# Patient Record
Sex: MALE | Race: WHITE | NOT HISPANIC OR LATINO | Employment: OTHER | URBAN - METROPOLITAN AREA
[De-identification: names, ages, dates, MRNs, and addresses within clinical notes are randomized per-mention and may not be internally consistent; named-entity substitution may affect disease eponyms.]

---

## 2021-05-25 ENCOUNTER — RECORDS - HEALTHEAST (OUTPATIENT)
Dept: ADMINISTRATIVE | Facility: CLINIC | Age: 26
End: 2021-05-25

## 2021-05-29 ENCOUNTER — RECORDS - HEALTHEAST (OUTPATIENT)
Dept: ADMINISTRATIVE | Facility: CLINIC | Age: 26
End: 2021-05-29

## 2021-05-30 ENCOUNTER — RECORDS - HEALTHEAST (OUTPATIENT)
Dept: ADMINISTRATIVE | Facility: CLINIC | Age: 26
End: 2021-05-30

## 2021-06-02 ENCOUNTER — RECORDS - HEALTHEAST (OUTPATIENT)
Dept: ADMINISTRATIVE | Facility: CLINIC | Age: 26
End: 2021-06-02

## 2022-03-30 ENCOUNTER — HOSPITAL ENCOUNTER (EMERGENCY)
Facility: CLINIC | Age: 27
Discharge: HOME | End: 2022-03-30
Attending: EMERGENCY MEDICINE | Admitting: EMERGENCY MEDICINE

## 2022-03-30 ENCOUNTER — APPOINTMENT (OUTPATIENT)
Dept: ULTRASOUND IMAGING | Facility: CLINIC | Age: 27
End: 2022-03-30
Attending: EMERGENCY MEDICINE

## 2022-03-30 VITALS
WEIGHT: 280 LBS | HEART RATE: 70 BPM | OXYGEN SATURATION: 96 % | DIASTOLIC BLOOD PRESSURE: 65 MMHG | TEMPERATURE: 97.9 F | RESPIRATION RATE: 20 BRPM | SYSTOLIC BLOOD PRESSURE: 110 MMHG

## 2022-03-30 DIAGNOSIS — Z90.79 HISTORY OF UNILATERAL ORCHIECTOMY: ICD-10-CM

## 2022-03-30 DIAGNOSIS — S30.22XA SCROTAL HEMATOMA: ICD-10-CM

## 2022-03-30 LAB
ANION GAP SERPL CALCULATED.3IONS-SCNC: 12 MMOL/L (ref 5–18)
BASOPHILS # BLD AUTO: 0.1 10E3/UL (ref 0–0.2)
BASOPHILS NFR BLD AUTO: 1 %
BUN SERPL-MCNC: 10 MG/DL (ref 8–22)
CALCIUM SERPL-MCNC: 8.6 MG/DL (ref 8.5–10.5)
CHLORIDE BLD-SCNC: 110 MMOL/L (ref 98–107)
CO2 SERPL-SCNC: 22 MMOL/L (ref 22–31)
CREAT SERPL-MCNC: 0.82 MG/DL (ref 0.7–1.3)
EOSINOPHIL # BLD AUTO: 0.3 10E3/UL (ref 0–0.7)
EOSINOPHIL NFR BLD AUTO: 4 %
ERYTHROCYTE [DISTWIDTH] IN BLOOD BY AUTOMATED COUNT: 13.6 % (ref 10–15)
GFR SERPL CREATININE-BSD FRML MDRD: >90 ML/MIN/1.73M2
GLUCOSE BLD-MCNC: 102 MG/DL (ref 70–125)
HCT VFR BLD AUTO: 44.6 % (ref 40–53)
HGB BLD-MCNC: 15.2 G/DL (ref 13.3–17.7)
IMM GRANULOCYTES # BLD: 0 10E3/UL
IMM GRANULOCYTES NFR BLD: 0 %
LACTATE SERPL-SCNC: 0.7 MMOL/L (ref 0.7–2)
LYMPHOCYTES # BLD AUTO: 2 10E3/UL (ref 0.8–5.3)
LYMPHOCYTES NFR BLD AUTO: 29 %
MCH RBC QN AUTO: 28.4 PG (ref 26.5–33)
MCHC RBC AUTO-ENTMCNC: 34.1 G/DL (ref 31.5–36.5)
MCV RBC AUTO: 83 FL (ref 78–100)
MONOCYTES # BLD AUTO: 0.7 10E3/UL (ref 0–1.3)
MONOCYTES NFR BLD AUTO: 10 %
NEUTROPHILS # BLD AUTO: 3.9 10E3/UL (ref 1.6–8.3)
NEUTROPHILS NFR BLD AUTO: 56 %
NRBC # BLD AUTO: 0 10E3/UL
NRBC BLD AUTO-RTO: 0 /100
PLATELET # BLD AUTO: 250 10E3/UL (ref 150–450)
POTASSIUM BLD-SCNC: 4.1 MMOL/L (ref 3.5–5)
RBC # BLD AUTO: 5.35 10E6/UL (ref 4.4–5.9)
SODIUM SERPL-SCNC: 144 MMOL/L (ref 136–145)
WBC # BLD AUTO: 7 10E3/UL (ref 4–11)

## 2022-03-30 PROCEDURE — 80048 BASIC METABOLIC PNL TOTAL CA: CPT | Performed by: EMERGENCY MEDICINE

## 2022-03-30 PROCEDURE — 93976 VASCULAR STUDY: CPT

## 2022-03-30 PROCEDURE — 99284 EMERGENCY DEPT VISIT MOD MDM: CPT | Mod: 25

## 2022-03-30 PROCEDURE — 99285 EMERGENCY DEPT VISIT HI MDM: CPT | Mod: 25

## 2022-03-30 PROCEDURE — 83605 ASSAY OF LACTIC ACID: CPT | Performed by: EMERGENCY MEDICINE

## 2022-03-30 PROCEDURE — 36415 COLL VENOUS BLD VENIPUNCTURE: CPT | Performed by: EMERGENCY MEDICINE

## 2022-03-30 PROCEDURE — 85025 COMPLETE CBC W/AUTO DIFF WBC: CPT | Performed by: EMERGENCY MEDICINE

## 2022-03-30 ASSESSMENT — ENCOUNTER SYMPTOMS
HEADACHES: 0
RHINORRHEA: 0
ABDOMINAL PAIN: 0
VOMITING: 0
SORE THROAT: 0
DIARRHEA: 0
DIFFICULTY URINATING: 0

## 2022-03-30 NOTE — ED TRIAGE NOTES
Pt reports he had left testicle removed on February 17th in [place] at [place], [place].  Pt reports abnormal swelling to that region that started this weekend.  Pt has been icing and took tylenol and ibuprofen prior to arrival.  Denies any problems voiding.

## 2022-03-30 NOTE — DISCHARGE INSTRUCTIONS
Read and follow the discharge instructions.    You have a large clot in the scrotum.  Dr. Dominguez is planning to see you next week    Return to the [facility] emergency department if you have increased swelling, redness, fever, or any other concerns.

## 2022-03-30 NOTE — ED PROVIDER NOTES
EMERGENCY DEPARTMENT ENCOUNTER      NAME: Israel Mcguire  AGE: 26 year old male  YOB: 1995  MRN: [id number]  EVALUATION DATE & TIME: 3/30/2022 10:47 AM    PCP: [facility] Donna Yost    ED PROVIDER: Jacki Payne M.D.      CHIEF COMPLAINT     Chief Complaint   Patient presents with     Post-op Problem         FINAL IMPRESSION:     1. Scrotal hematoma    2. History of unilateral orchiectomy          MEDICAL DECISION MAKING:       Pertinent Labs & Imaging studies reviewed. (See chart for details)    26 year old male presents to the Emergency Department for evaluation of left scrotal swelling    ED Course as of 03/30/22 1303   Wed Mar 30, 2022   1107 Mr. Mcguire 26-year-old male who states in February he had the left testicle removed because of cancer.  He reports that he did not have to undergo any chemotherapy.  7 days ago he noticed some swelling was seen at the clinic had an ultrasound and was told to come back if he becomes more swollen.   1107 He noticed that he has become more swollen with some pain but denies any color change.  She denies any other systemic symptoms.   1107 On examination obese cooperative and pleasant in no distress.   exam with a chaperone shows heel surgical scar with no dehiscence.   1108 He has had left a centimeter mass in the left scrotum with no scrotal erythema or edema.  Mildly tender.  He is circumcised.  Right scrotum without tenderness.   1108 Differential diagnoses include but not limited to hematoma, less likely abscesses, epididymitis,, and others.   1108 Will obtain ultrasound with Doppler.  And will review previous records.   1115 recent records reviewed patient had a left orchiectomy after a seminoma.  The latest ultrasound reveal hematoma versus early abscess.  We will do lab work and call patient's urologist.   1223 Ultrasound complex hematoma.  Cannot exclude infection.   1227 With radiologist.  She think is more of a complex hematoma versus abscess.   Normal lactic acid a white blood cell call.  Will call patient's urologist Dr. Dominguez   1253 With the radiologist.  Unfortunately the initial ultrasound that he recently had did not gave a measurement of the hematoma.  After discussion with the radiologist felt more likely that this is a hematoma.   1253 Inflammatory infectious markers are negative.  Discussed with patient's urologist Dr. Dominguez  Per his report patient had an appointment this week but the patient requested to be pushed till next week.   1254 Discussed with patient who feels comfortable being discharged.  Did talk to him about increased pain fever or sign is or any other concerns Dr. Dominguez prefers patient to follow-up with [facility] patient instructed to go to the nearest emergency department if he is not close to male.  Patient discharged ambulatory stable condition.   1255 Clinical impression and decision making  26-year-old male status post left orchiectomy for seminoma presents here with increased swelling.  Is indurated.  There is no scrotal erythema.  No clinical signs of infection.  Ultrasound more concerning for hematoma.  Will follow up with urology as scheduled and will seek help for any worsening symptoms.   1256 ADDENDUM  Dr. Dominguez urologist called back.  He said he was able to make sure the hematoma on the initial ultrasound and he was around 5x 2 cm cm before he has an increase significantly.  He contacted patient because the plan is to take him to the OR next week.  He recommends patient follows up at the [facility] emergency department if he has swelling redness fever increased size or any other concerns.   1301 MCHC: 34.1       Vital Signs: Reviewed  EKG: None  Imaging: Ultrasound 6 x 4 scrotal mass  Home Meds: reviewed  ED meds/abx: none  Fluids:oral    Labs  K 4.1  Cr 0.82  Wbc 7  Hgb 15.2  Platelets 250  Lactic acid negative        Review of Previous Records  2/17/22 Left Radical Orchiectomy:      3/7/22 Follow-up with Urology: I met with  Israel today in clinic today. The patient has done well since surgery. We discussed his pathology and options going forward and had a shared decision making discussion. Given he has Stage 1A seminoma, options include observation, radiation therapy and one dose of carboplatin. I would recommend observation/surveillance. I did discuss that he would be at an increased risk of recurrence with observation, around 15-20% as compared to radiation and chemotherapy which would be approximately 3%. I informed the patient that although there is an increased risk of recurrence, his survival is unchanged.     If we follow with surveillance, would recommend history and physical exam and imaging of the abdomen/pelvis every 4-6 months for 2 years, then every 6-12 months for years 3-5. We don't necessarily have to get markers or chest imaging unless there are concerns on exam or CT.    Plan:   --tumor markers in 2 weeks  --return in 4 months with physical exam, imaging and labs    3/24/22 ED Evaluation at [facility] Carlito Fleming: The pt has increase in pain is scrotum over the past 4 days. Surgical procedure by Urology performed 02/17/2022. Patient has otherwise been well until the past few days. He describes that there has been some pain with urination. Urinalysis is pending here. Given the concern for post surgical complication as well as the patient's swelling is left scrotum patient will be transfer to [place] for ultrasound and will likely need urologic consultation after ultrasound is obtained.    Ultrasound:  IMPRESSION:  1. Postoperative changes of left radical nephrectomy. There is a complex fluid collection in the  left hemiscrotum with multiple internal septae and diffuse internal echogenic foci. Findings could  represent a large complex hematoma or developing abscess. Diffuse overlying skin thickening.  2. No evidence of torsion on the right.    3/27/22 Phone Call with Urology: Appointment scheduled for  4/7/22.    Consults  Radiologist  Urology - Dr. Dominguez    ED COURSE     10:59 AM I introduced myself to the patient, performed my physical exam, and discussed plan for ED workup.    11:10 AM Placed call to patient's urologist, Dr. Dominguez.    12:25 PM Spoke with the radiologist. Discussed comparison of today's ultrasound with the ultrasound performed on the 3/24/22.    12:28 PM Updated the patient. Still awaiting call back from Dr. Dominguez.    12:49 PM Spoke with the patient's urologist, Dr. Dominguez.    12:51 PM Updated the patient.    At the conclusion of the encounter I discussed the results of all of the tests and the disposition. The questions were answered. The patient and his father acknowledged understanding and was agreeable with the care plan.         MEDICATIONS GIVEN IN THE EMERGENCY:   Medications - No data to display    NEW PRESCRIPTIONS STARTED AT TODAY'S ER VISIT     New Prescriptions    No medications on file          =================================================================    HPI     Patient information was obtained from: Patient    Use of : N/A       Israel Mcguire is a 26 year old male with a history of bipolar disorder, intellectual disability, and left testicular mass s/p left radical orchiectomy who presents by walk in for evaluation of scrotal swelling.    The patient reports having his left testicle removed on 2/17/22 after he was found to have a mass on his left testicle. After removal of the testicle the mass was found to be cancerous. He did not require admission to the hospital after surgery.    The patient reports he has had increased swelling to the left scrotum since Thursday. He had this evaluated in [place], [place] and an ultrasound was performed. The ultrasound showed fluid collection in the left hemiscrotum which could represent large complex hematoma or developing abscess. He was discharged to home with instructions to return for evaluation if the swelling becomes  worse.    The patient normally receives his care in [place], [place], but he is currently in the [place] area helping his father move. He has not had any recent falls or trauma.    He denies abdominal pain, vomiting, diarrhea, difficulty urinating, leg swelling, rashes, headache, vision changes, sore throat, runny nose, and any other complaints at this time.    He is a nonsmoker. He denies alcohol and drug use. He is vaccinated for COVID.    REVIEW OF SYSTEMS   Review of Systems   HENT: Negative for rhinorrhea and sore throat.    Eyes: Negative for visual disturbance.   Cardiovascular: Negative for leg swelling.   Gastrointestinal: Negative for abdominal pain, diarrhea and vomiting.   Genitourinary: Positive for scrotal swelling. Negative for difficulty urinating.   Skin: Negative for rash.   Neurological: Negative for headaches.   All other systems reviewed and are negative.      PAST MEDICAL HISTORY:   History reviewed. No pertinent past medical history.    PAST SURGICAL HISTORY:   History reviewed. No pertinent surgical history.      CURRENT MEDICATIONS:   No current outpatient medications on file.       ALLERGIES:   No Known Allergies    FAMILY HISTORY:   History reviewed. No pertinent family history.    SOCIAL HISTORY:     Social History     Socioeconomic History     Marital status: Single     Spouse name: Not on file     Number of children: Not on file     Years of education: Not on file     Highest education level: Not on file   Occupational History     Not on file   Tobacco Use     Smoking status: Not on file     Smokeless tobacco: Not on file   Substance and Sexual Activity     Alcohol use: Not on file     Drug use: Not on file     Sexual activity: Not on file   Other Topics Concern     Not on file   Social History Narrative     Not on file     Social Determinants of Health     Financial Resource Strain: Not on file   Food Insecurity: Not on file   Transportation Needs: Not on file   Physical Activity:  Not on file   Stress: Not on file   Social Connections: Not on file   Intimate Partner Violence: Not on file   Housing Stability: Not on file       VITALS:   /85   Pulse 90   Temp 97.9  F (36.6  C) (Oral)   Resp 22   Wt 127 kg (280 lb)   SpO2 97%     PHYSICAL EXAM     Physical Exam  Vitals reviewed. Exam conducted with a chaperone present.   Constitutional:       General: He is not in acute distress.     Appearance: Normal appearance. He is obese. He is not ill-appearing, toxic-appearing or diaphoretic.   Genitourinary:     Penis: Normal and circumcised.       Testes:         Right: Mass, tenderness or swelling not present. Right testis is descended.         Left: Mass and tenderness present.          Comments: Left testicle without tenderness palpation.   indurated large mass on the right scrotal sac.  There is no scrotal erythema or edema.  There is no fluctuance.  Neurological:      Mental Status: He is alert.         Physical Exam   Constitutional: Nontonxic, Cooperative, Pleasant, Overweight.    Head: Atraumatic.     Nose: Nose normal.     Mouth/Throat: Oropharynx is clear and moist.     Eyes: EOM are normal. Pupils are equal, round, and reactive to light.     Ears: Pearly white TMs bilaterally.    Neck: Normal range of motion. Neck supple.     Cardiovascular: Normal rate, regular rhythm and normal heart sounds.      Pulmonary/Chest: Normal effort  and breath sounds normal.     Abdominal: Soft, nontender.    Musculoskeletal: Normal range of motion.     Neurological: No focal deficits.    Lymphatics: Facial adenopathy. No peripheral edema.    :  With Chaperone circumcised male.  Large indurated mass on the left scrotum.  No perineal erythema edema or crepitus.    Skin: Skin is warm and dry.     Psychiatric: Normal mood and affect. Behavior is normal.       LAB:     All pertinent labs reviewed and interpreted.  Labs Ordered and Resulted from Time of ED Arrival to Time of ED Departure   BASIC  METABOLIC PANEL - Abnormal       Result Value    Sodium 144      Potassium 4.1      Chloride 110 (*)     Carbon Dioxide (CO2) 22      Anion Gap 12      Urea Nitrogen 10      Creatinine 0.82      Calcium 8.6      Glucose 102      GFR Estimate >90     LACTIC ACID WHOLE BLOOD - Normal    Lactic Acid 0.7     CBC WITH PLATELETS AND DIFFERENTIAL    WBC Count 7.0      RBC Count 5.35      Hemoglobin 15.2      Hematocrit 44.6      MCV 83      MCH 28.4      MCHC 34.1      RDW 13.6      Platelet Count 250      % Neutrophils 56      % Lymphocytes 29      % Monocytes 10      % Eosinophils 4      % Basophils 1      % Immature Granulocytes 0      NRBCs per 100 WBC 0      Absolute Neutrophils 3.9      Absolute Lymphocytes 2.0      Absolute Monocytes 0.7      Absolute Eosinophils 0.3      Absolute Basophils 0.1      Absolute Immature Granulocytes 0.0      Absolute NRBCs 0.0          RADIOLOGY:     Reviewed all pertinent imaging. Please see official radiology report.  US Testicular & Scrotum w Doppler Ltd   Final Result   IMPRESSION:   1.  Complex hematoma in the left scrotal sac as noted above. Could compare with prior report if available to ascertain if there has been an  interval change.   2.  Need to assess clinically regarding any signs of infection of this hematoma.           EKG:       I have independently reviewed and interpreted the EKG(s) documented above.      PROCEDURES:     Procedures      I, Jesus Arreola, am serving as a scribe to document services personally performed by Dr. Payne based on my observation and the provider's statements to me. I, Jacki Payne MD attest that Jesus Arreola is acting in a scribe capacity, has observed my performance of the services and has documented them in accordance with my direction.    Jacki Payne M.D.  Emergency Medicine  [facility] EMERGENCY ROOM  [address] [address] [phone]  Dept:  [phone]     Jacki Payne MD  03/30/22 [id number]

## 2022-03-31 ENCOUNTER — PATIENT OUTREACH (OUTPATIENT)
Dept: CARE COORDINATION | Facility: CLINIC | Age: 27
End: 2022-03-31

## 2022-03-31 DIAGNOSIS — Z71.89 OTHER SPECIFIED COUNSELING: ICD-10-CM

## 2022-03-31 NOTE — PROGRESS NOTES
"Clinic Care Coordination Contact  [facility]: Post-Discharge Note  SITUATION                                                      Admission:    Admission Date: 03/30/22   Reason for Admission: Scrotal hematoma  Discharge:   Discharge Date: 03/30/22  Discharge Diagnosis: Scrotal hematoma    BACKGROUND                                                      Per hospital discharge summary and inpatient provider notes:  Israel Mcguire is a 26 year old male with a history of bipolar disorder, intellectual disability, and left testicular mass s/p left radical orchiectomy who presents by walk in for evaluation of scrotal swelling.     The patient reports having his left testicle removed on 2/17/22 after he was found to have a mass on his left testicle. After removal of the testicle the mass was found to be cancerous. He did not require admission to the hospital after surgery.     The patient reports he has had increased swelling to the left scrotum since Thursday. He had this evaluated in [place], [place] and an ultrasound was performed. The ultrasound showed fluid collection in the left hemiscrotum which could represent large complex hematoma or developing abscess. He was discharged to home with instructions to return for evaluation if the swelling becomes worse.     The patient normally receives his care in [place], [place], but he is currently in the [place] area helping his father move. He has not had any recent falls or trauma.     He denies abdominal pain, vomiting, diarrhea, difficulty urinating, leg swelling, rashes, headache, vision changes, sore throat, runny nose, and any other complaints at this time.     He is a nonsmoker. He denies alcohol and drug use. He is vaccinated for COVID    ASSESSMENT      Enrollment  Primary Care Care Coordination Status: Not a Candidate    Discharge Assessment  How are you doing now that you are home?: \" I'm okay\"  How are your symptoms? (Red Flag symptoms escalate to " triage hotline per guidelines): Unchanged  Do you feel your condition is stable enough to be safe at home until your provider visit?: Yes  Does the patient have their discharge instructions? : Yes  Does the patient have questions regarding their discharge instructions? : No  Were you started on any new medications or were there changes to any of your previous medications? : No  Does the patient have all of their medications?: Yes  Do you have questions regarding any of your medications? : No  Do you have all of your needed medical supplies or equipment (DME)?  (i.e. oxygen tank, CPAP, cane, etc.): Yes  Discharge follow-up appointment scheduled within 14 calendar days? : Yes  Discharge Follow Up Appointment Date: 04/07/22  Discharge Follow Up Appointment Scheduled with?: Specialty Care Provider    Post-op ([facility] Only)  If the patient had a surgery or procedure, do they have any questions for a nurse?: No             PLAN                                                      Outpatient Plan:  You have a large clot in the scrotum. Dr. Dominguez is planning to see you  next week  Return to the [facility] emergency department if you have increased  swelling, redness, fever, or any other concerns.  Why: Dr. Dominguez next because previously instructed. [facility] emergency department for increased swelling and  redness.  Contact: [address] [place] [id number] [phone] No future appointments.      For any urgent concerns, please contact our 24 hour nurse triage line: [phone] ([phone])         Jessica Lucero MA